# Patient Record
Sex: FEMALE | Race: WHITE
[De-identification: names, ages, dates, MRNs, and addresses within clinical notes are randomized per-mention and may not be internally consistent; named-entity substitution may affect disease eponyms.]

---

## 2018-04-16 ENCOUNTER — HOSPITAL ENCOUNTER (EMERGENCY)
Dept: HOSPITAL 97 - ER | Age: 68
LOS: 1 days | Discharge: HOME | End: 2018-04-17
Payer: COMMERCIAL

## 2018-04-16 DIAGNOSIS — Z88.1: ICD-10-CM

## 2018-04-16 DIAGNOSIS — I10: ICD-10-CM

## 2018-04-16 DIAGNOSIS — R11.0: ICD-10-CM

## 2018-04-16 DIAGNOSIS — N39.0: Primary | ICD-10-CM

## 2018-04-16 DIAGNOSIS — Z86.73: ICD-10-CM

## 2018-04-16 DIAGNOSIS — G43.909: ICD-10-CM

## 2018-04-16 DIAGNOSIS — Z91.048: ICD-10-CM

## 2018-04-16 LAB
ALBUMIN SERPL BCP-MCNC: 3.8 G/DL (ref 3.2–5.5)
ALP SERPL-CCNC: 87 IU/L (ref 42–121)
ALT SERPL W P-5'-P-CCNC: 15 IU/L (ref 10–60)
AST SERPL W P-5'-P-CCNC: 16 IU/L (ref 10–42)
BUN BLD-MCNC: 16 MG/DL (ref 6–20)
CKMB CREATINE KINASE MB: 1.3 NG/ML (ref 0.3–4)
GLUCOSE SERPLBLD-MCNC: 87 MG/DL (ref 65–120)
HCT VFR BLD CALC: 33.4 % (ref 36–45)
INR BLD: 1.11
LIPASE SERPL-CCNC: 14 U/L (ref 22–51)
LYMPHOCYTES # SPEC AUTO: 2 K/UL (ref 0.7–4.9)
MAGNESIUM SERPL-MCNC: 1.9 MG/DL (ref 1.8–2.5)
MCH RBC QN AUTO: 31.4 PG (ref 27–35)
MCV RBC: 92.1 FL (ref 80–100)
PMV BLD: 7.3 FL (ref 7.6–11.3)
POTASSIUM SERPL-SCNC: 3.8 MEQ/L (ref 3.6–5)
RBC # BLD: 3.63 M/UL (ref 3.86–4.86)

## 2018-04-16 PROCEDURE — 80048 BASIC METABOLIC PNL TOTAL CA: CPT

## 2018-04-16 PROCEDURE — 87088 URINE BACTERIA CULTURE: CPT

## 2018-04-16 PROCEDURE — 82550 ASSAY OF CK (CPK): CPT

## 2018-04-16 PROCEDURE — 96361 HYDRATE IV INFUSION ADD-ON: CPT

## 2018-04-16 PROCEDURE — 87086 URINE CULTURE/COLONY COUNT: CPT

## 2018-04-16 PROCEDURE — 83735 ASSAY OF MAGNESIUM: CPT

## 2018-04-16 PROCEDURE — 93005 ELECTROCARDIOGRAM TRACING: CPT

## 2018-04-16 PROCEDURE — 82553 CREATINE MB FRACTION: CPT

## 2018-04-16 PROCEDURE — 96375 TX/PRO/DX INJ NEW DRUG ADDON: CPT

## 2018-04-16 PROCEDURE — 70450 CT HEAD/BRAIN W/O DYE: CPT

## 2018-04-16 PROCEDURE — 87186 SC STD MICRODIL/AGAR DIL: CPT

## 2018-04-16 PROCEDURE — 83690 ASSAY OF LIPASE: CPT

## 2018-04-16 PROCEDURE — 36415 COLL VENOUS BLD VENIPUNCTURE: CPT

## 2018-04-16 PROCEDURE — 85025 COMPLETE CBC W/AUTO DIFF WBC: CPT

## 2018-04-16 PROCEDURE — 87077 CULTURE AEROBIC IDENTIFY: CPT

## 2018-04-16 PROCEDURE — 85730 THROMBOPLASTIN TIME PARTIAL: CPT

## 2018-04-16 PROCEDURE — 83880 ASSAY OF NATRIURETIC PEPTIDE: CPT

## 2018-04-16 PROCEDURE — 85610 PROTHROMBIN TIME: CPT

## 2018-04-16 PROCEDURE — 84484 ASSAY OF TROPONIN QUANT: CPT

## 2018-04-16 PROCEDURE — 80076 HEPATIC FUNCTION PANEL: CPT

## 2018-04-16 PROCEDURE — 81003 URINALYSIS AUTO W/O SCOPE: CPT

## 2018-04-16 PROCEDURE — 99284 EMERGENCY DEPT VISIT MOD MDM: CPT

## 2018-04-16 PROCEDURE — 96374 THER/PROPH/DIAG INJ IV PUSH: CPT

## 2018-04-16 PROCEDURE — 70496 CT ANGIOGRAPHY HEAD: CPT

## 2018-04-16 PROCEDURE — 71045 X-RAY EXAM CHEST 1 VIEW: CPT

## 2018-04-17 VITALS — TEMPERATURE: 98.1 F

## 2018-04-17 VITALS — DIASTOLIC BLOOD PRESSURE: 77 MMHG | OXYGEN SATURATION: 98 % | SYSTOLIC BLOOD PRESSURE: 159 MMHG

## 2018-04-17 NOTE — ER
Nurse's Notes                                                                                     

 [facility]                                                                

Name: Chelsea Alaniz                                                                               

Age: 67 yrs                                                                                       

Sex: Female                                                                                       

: 1950                                                                                   

MRN: [id number]                                                                                   

Arrival Date: 2018                                                                          

Time: 21:43                                                                                       

Account#: [id number]                                                                            

Bed 7                                                                                             

Private MD:                                                                                       

Diagnosis: Headache;Urinary tract infection, site not specified;Migraine;Nausea                   

                                                                                                  

Presentation:                                                                                     

                                                                                             

21:47 Presenting complaint: Patient states: I have had a migraine and been nauseous since     la1 

      1400. I have tried my PRN toradol at home and its not helping. Transition of care:          

      patient was not received from another setting of care. Onset of symptoms was 2018. Care prior to arrival: None.                                                          

21:47 Method Of Arrival: Wheelchair                                                           la1 

21:47 Acuity: ALLYSON 3                                                                           la1 

                                                                                             

01:45 Mechanism of Injury: No Mechanism of Injury.                                            tl1 

                                                                                                  

Triage Assessment:                                                                                

                                                                                             

22:37 Headache History: The patient has had previous headaches. General: Appears in no        ao  

      apparent distress. comfortable. General: Behavior is calm, cooperative, appropriate for     

      age. Pain: Pain currently is 10 out of 10 on a pain scale. Pain began 2-3 days ago.         

      Also complains of no other associated symptoms.                                             

                                                                                                  

Historical:                                                                                       

- Allergies:                                                                                      

21:52 Potassium Chloride; can only take IV form;                                              la1 

21:52 Uknown abx (triliptipine?);                                                             la1 

- PMHx:                                                                                           

21:52 Diabetes - NIDDM; High Cholesterol; Anemia; Anxiety; Migraines; Hypertension; CHF;      la1 

      COPD; back pain; CVA; TIA;                                                                  

- PSHx:                                                                                           

21:52 stent for aneurysm in brain; heart cath; Cholecystectomy; Hysterectomy;                 la1 

                                                                                                  

- Immunization history:: Adult Immunizations up to date.                                          

- Social history:: Smoking status: Patient/guardian denies using tobacco.                         

- Family history:: not pertinent.                                                                 

                                                                                                  

                                                                                                  

Screenin:36 Abuse screen: Denies threats or abuse. Denies injuries from another. Nutritional        ao  

      screening: No deficits noted. Tuberculosis screening: No symptoms or risk factors           

      identified. Fall Risk None identified.                                                      

                                                                                                  

Assessment:                                                                                       

22:32 General: Appears in no apparent distress. comfortable, Behavior is calm, cooperative,   ao  

      appropriate for age. Pain: Complains of pain in Migraine. Neuro: Level of Consciousness     

      is awake, alert, obeys commands, Oriented to person, place, time, situation,                

      Appropriate for age Moves all extremities. Speech is normal, Facial symmetry appears        

      normal, Pupils are PERRLA. Cardiovascular: Capillary refill < 3 seconds Patient's skin      

      is warm and dry. Respiratory: Airway is patent Respiratory effort is even, unlabored,       

      Respiratory pattern is regular, symmetrical. GI: Abdomen is obese. : No signs and/or      

      symptoms were reported regarding the genitourinary system. EENT: No signs and/or            

      symptoms were reported regarding the EENT system. Derm: No signs and/or symptoms            

      reported regarding the dermatologic system. Musculoskeletal: No signs and/or symptoms       

      reported regarding the musculoskeletal system.                                              

23:32 Reassessment: Patient appears in no apparent distress at this time. Patient and/or      ao  

      family updated on plan of care and expected duration. Pain level reassessed. Patient is     

      alert, oriented x 3, equal unlabored respirations, skin warm/dry/pink. Patient still        

      had pain. Dr Hunt notified.                                                             

                                                                                             

00:39 Reassessment: Patient appears in no apparent distress at this time. Patient and/or      ao  

      family updated on plan of care and expected duration. Pain level reassessed. Patient is     

      alert, oriented x 3, equal unlabored respirations, skin warm/dry/pink.                      

01:44 Reassessment: Patient and/or family updated on plan of care and expected duration. Pain tl1 

      level reassessed. Patient is alert, oriented x 3, equal unlabored respirations, skin        

      warm/dry/pink. Patient states feeling better. Patient states symptoms have improved.        

                                                                                                  

Vital Signs:                                                                                      

                                                                                             

21:52  / 80; Pulse 57; Resp 16; Temp 98.1; Pulse Ox 100% on R/A; Weight 124.74 kg;      la1 

      Height 5 ft. 1 in. (154.94 cm);                                                             

23:32  / 66; Pulse 56; Resp 16; Pulse Ox 100% ;                                         ao  

                                                                                             

00:20  / 60; Pulse 55; Resp 16; Pulse Ox 100% ;                                         ao  

01:34  / 77; Pulse 59; Resp 16; Temp 98.1; Pulse Ox 98% ; Pain 5/10;                    tl1 

                                                                                             

21:52 Body Mass Index 51.96 (124.74 kg, 154.94 cm)                                            la1 

                                                                                                  

ED Course:                                                                                        

                                                                                             

21:43 Patient arrived in ED.                                                                  es  

21:48 Triage completed.                                                                       la1 

21:53 Arm band placed on left wrist.                                                          la1 

22:12 Duncan Burgos RN is Primary Nurse.                                                       ao  

22:25 Nish Hunt MD is Attending Physician.                                             alecia 

22:32 Inserted saline lock: 20 gauge in left antecubital area, using aseptic technique.       ao  

      ,using aseptic technique. Using ultrasound guided.                                          

22:36 Patient has correct armband on for positive identification. Pulse ox on. NIBP on.       ao  

22:36 No provider procedures requiring assistance completed.                                  ao  

22:41 Head Brain Wo Cont CT In Process Unspecified.                                           EDMS

23:29 XRAY Chest (1 view) In Process Unspecified.                                             EDMS

04                                                                                             

00:50 Head angio In Process Unspecified.                                                      EDMS

01:09 Phil Marie MD is Referral Physician.                                             alecia 

01:27 Urine Dipstick--Ancillary (enter results) Sent.                                         tl1 

01:45 IV discontinued, intact, bleeding controlled, No redness/swelling at site. Pressure     tl1 

      dressing applied.                                                                           

                                                                                                  

Administered Medications:                                                                         

                                                                                             

22:55 Drug: Zofran 4 mg Route: IVP; Site: left antecubital;                                   ao  

                                                                                             

00:59 Follow up: Response: No adverse reaction                                                ao  

                                                                                             

22:58 Drug: fentaNYL (PF) 50 mcg Route: IVP; Site: left antecubital;                          ao  

                                                                                             

00:58 Follow up: Response: No adverse reaction                                                ao  

00:58 Follow up: Response: Pain is unchanged, physician notified                              ao  

                                                                                             

23:00 Drug: NS 0.9% 1000 ml Route: IV; Rate: 1 bolus; Site: left antecubital;                 ao  

                                                                                             

01:48 Follow up: IV Status: Completed infusion                                                tl1 

00:54 Drug: Zofran 4 mg Route: IVP; Site: left antecubital;                                   ao  

01:26 Follow up: Response: No adverse reaction; Nausea is decreased                           tl1 

00:58 Drug: morphine 2 mg Route: IVP; Site: left antecubital;                                 ao  

01:47 Follow up: Response: No adverse reaction; Marked relief of symptoms; Pain is decreased  tl1 

01:24 Drug: Rocephin - (cefTRIAXone) 1 grams Route: IVPB; Infused Over: 30 mins; Site: left   tl1 

      antecubital;                                                                                

01:38 Follow up: IV Status: Completed infusion                                                tl1 

01:24 Drug: Cipro 500 mg Route: PO;                                                           tl1 

01:46 Follow up: Response: No adverse reaction; No change in condition                        tl1 

01:25 Drug: Benadryl 25 mg Route: IVP; Infused Over: 2 mins; Site: left antecubital;          tl1 

01:46 Follow up: Response: No adverse reaction; Marked relief of symptoms; Pain is decreased  tl1 

01:26 Drug: TORadol 30 mg Route: IVP; Infused Over: 2 mins; Site: left antecubital;           tl1 

01:46 Follow up: Response: No adverse reaction; Marked relief of symptoms; Pain is decreased  tl1 

01:36 Drug: morphine 2 mg Route: IVP; Infused Over: 2 mins; Site: left antecubital;           tl1 

01:47 Follow up: Response: No adverse reaction; Marked relief of symptoms; Pain is decreased  tl1 

                                                                                                  

                                                                                                  

Outcome:                                                                                          

01:09 Discharge ordered by MD. alston 

01:45 Discharged to home via wheelchair, with family.                                         tl1 

01:45 Condition: improved                                                                         

01:45 Discharge instructions given to patient, family, Instructed on discharge instructions,      

      follow up and referral plans. medication usage, Demonstrated understanding of               

      instructions, follow-up care, medications, Prescriptions given X 3.                         

01:48 Patient left the ED.                                                                    tl1 

                                                                                                  

Addendum:                                                                                         

2018                                                                                        

     08:22 Addendum: Culture Results: Positive urine culture. Bacteria is resistant to, has        i
w

           intermediate sensitivity, or is not tested against prescribed antibiotics. Report given

           to ALVA for further evaluation and then to ED Manager for follow up with patient. Phone 

           call Attempt #1 pt phone is disconnected.                                              

                                                                                                  

Signatures:                                                                                       

Dispatcher [employer]                           Nish Fernandez MD MD cha Salyer, Edna es Williams, Irene, RN RN iw Attema, Lee, RN RN la1                                                  

Latia Yan RN RN   tl1                                                  

Duncan Burgos RN                         RN   ao                                                   

                                                                                                  

Corrections: (The following items were deleted from the chart)                                    

                                                                                             

01:36 00:20  / 60; Pulse 55bpm; Resp 16bpm; Pulse Ox 100%; Pain 0/10; ao                ao  

                                                                                                  

**************************************************************************************************

## 2018-04-17 NOTE — RAD REPORT
EXAM DESCRIPTION:  RAD - Chest Single View - 4/16/2018 11:31 pm

 

CLINICAL HISTORY:  Cough

 

COMPARISON:  None.

 

TECHNIQUE:  AP portable chest image was obtained 2320 hours .

 

FINDINGS:  Normal volume. No peripheral consolidation or mass identified. Vague nodular density along
 the left heart shadow border in the lower left lung field is probably a summation artifact. Portable
 imaging and body habitus limit assessment. Mediastinum is prominent due to slight rotation, body hab
itus and portable imaging. Trachea is midline. Heart and vasculature are normal. No measurable pleura
l effusion and no pneumothorax. No gross bony abnormality seen. No acute aortic findings suspected.

 

IMPRESSION:  No acute cardiopulmonary process.

 

Exam is limited due to portable technique and large body habitus. Followup two view imaging could be 
obtained for further assessment.

## 2018-04-17 NOTE — EKG
Test Date:    2018-04-16               Test Time:    22:51:11

Technician:   EPI                                     

                                                     

MEASUREMENT RESULTS:                                       

Intervals:                                           

Rate:         53                                     

WI:           186                                    

QRSD:         88                                     

QT:           492                                    

QTc:          461                                    

Axis:                                                

P:            79                                     

WI:           186                                    

QRS:          -14                                    

T:            50                                     

                                                     

INTERPRETIVE STATEMENTS:                                       

                                                     

Sinus bradycardia

Low voltage QRS

Borderline ECG

No previous ECG available for comparison



Electronically Signed On 04-17-18 08:25:36 CDT by Mahesh Rhodes

## 2018-04-17 NOTE — EDPHYS
Physician Documentation                                                                           

 [facility]                                                                

Name: Chelsea Alaniz                                                                               

Age: 67 yrs                                                                                       

Sex: Female                                                                                       

: 1950                                                                                   

MRN: [id number]                                                                                   

Arrival Date: 2018                                                                          

Time: 21:43                                                                                       

Account#: [id number]                                                                            

Bed 7                                                                                             

Private MD:                                                                                       

ED Physician Nish Hunt                                                                      

HPI:                                                                                              

                                                                                             

22:34 This 67 yrs old  Female presents to ER via Wheelchair with complaints of       alecia 

      Headache.                                                                                   

22:34 The patient complains of pain to the top of head, forehead, left frontal area, left     alecia 

      side of the back of head, left occipital area, right frontal area, right side of the        

      back of head and right temporal area. The patient describes the headache as constant.       

      Onset: The symptoms/episode began/occurred yesterday. Associated signs and symptoms:        

      Pertinent positives: nausea. Severity of symptoms: At its worst the pain was moderate,      

      in the emergency department the pain is unchanged. Headache History: The patient has        

      had previous headaches and this one is similar to previous episodes. The symptoms are       

      alleviated by nothing. the symptoms are aggravated by nothing. The patient has              

      experienced similar episodes in the past, several times.                                    

                                                                                                  

Historical:                                                                                       

- Allergies:                                                                                      

21:52 Potassium Chloride; can only take IV form;                                              la1 

21:52 Uknown abx (triliptipine?);                                                             la1 

- PMHx:                                                                                           

21:52 Diabetes - NIDDM; High Cholesterol; Anemia; Anxiety; Migraines; Hypertension; CHF;      la1 

      COPD; back pain; CVA; TIA;                                                                  

- PSHx:                                                                                           

21:52 stent for aneurysm in brain; heart cath; Cholecystectomy; Hysterectomy;                 la1 

                                                                                                  

- Immunization history:: Adult Immunizations up to date.                                          

- Social history:: Smoking status: Patient/guardian denies using tobacco.                         

- Family history:: not pertinent.                                                                 

                                                                                                  

                                                                                                  

ROS:                                                                                              

22:34 Constitutional: Negative for fever, chills, and weight loss, Eyes: Negative for injury, alecia 

      pain, redness, and discharge, ENT: Negative for injury, pain, and discharge, Neck:          

      Negative for injury, pain, and swelling, Cardiovascular: Negative for chest pain,           

      palpitations, and edema, Respiratory: Negative for shortness of breath, cough,              

      wheezing, and pleuritic chest pain, Abdomen/GI: Negative for abdominal pain, nausea,        

      vomiting, diarrhea, and constipation, Back: Negative for injury and pain, : Negative      

      for injury, bleeding, discharge, and swelling, MS/Extremity: Negative for injury and        

      deformity, Skin: Negative for injury, rash, and discoloration, Psych: Negative for          

      depression, anxiety, suicide ideation, homicidal ideation, and hallucinations,              

      Allergy/Immunology: Negative for hives, rash, and allergies, Endocrine: Negative for        

      neck swelling, polydipsia, polyuria, polyphagia, and marked weight changes,                 

      Hematologic/Lymphatic: Negative for swollen nodes, abnormal bleeding, and unusual           

      bruising.                                                                                   

22:34 Neuro: Positive for headache.                                                               

                                                                                                  

Exam:                                                                                             

22:34 Constitutional:  This is a well developed, well nourished patient who is awake, alert,  alecia 

      and in no acute distress. Head/Face:  Normocephalic, atraumatic. Eyes:  Pupils equal        

      round and reactive to light, extra-ocular motions intact.  Lids and lashes normal.          

      Conjunctiva and sclera are non-icteric and not injected.  Cornea within normal limits.      

      Periorbital areas with no swelling, redness, or edema. ENT:  Nares patent. No nasal         

      discharge, no septal abnormalities noted.  Tympanic membranes are normal and external       

      auditory canals are clear.  Oropharynx with no redness, swelling, or masses, exudates,      

      or evidence of obstruction, uvula midline.  Mucous membranes moist. Neck:  Trachea          

      midline, no thyromegaly or masses palpated, and no cervical lymphadenopathy.  Supple,       

      full range of motion without nuchal rigidity, or vertebral point tenderness.  No            

      Meningismus. Chest/axilla:  Normal chest wall appearance and motion.  Nontender with no     

      deformity.  No lesions are appreciated. Cardiovascular:  Regular rate and rhythm with a     

      normal S1 and S2.  No gallops, murmurs, or rubs.  Normal PMI, no JVD.  No pulse             

      deficits. Respiratory:  Lungs have equal breath sounds bilaterally, clear to                

      auscultation and percussion.  No rales, rhonchi or wheezes noted.  No increased work of     

      breathing, no retractions or nasal flaring. Abdomen/GI:  Soft, non-tender, with normal      

      bowel sounds.  No distension or tympany.  No guarding or rebound.  No evidence of           

      tenderness throughout. Back:  No spinal tenderness.  No costovertebral tenderness.          

      Full range of motion. Skin:  Warm, dry with normal turgor.  Normal color with no            

      rashes, no lesions, and no evidence of cellulitis. MS/ Extremity:  Pulses equal, no         

      cyanosis.  Neurovascular intact.  Full, normal range of motion. Neuro:  Awake and           

      alert, GCS 15, oriented to person, place, time, and situation.  Cranial nerves II-XII       

      grossly intact.  Motor strength 5/5 in all extremities.  Sensory grossly intact.            

      Cerebellar exam normal.  Normal gait. Psych:  Awake, alert, with orientation to person,     

      place and time.  Behavior, mood, and affect are within normal limits.                       

22:34 Neck: ROM/movement: is normal, no acute changes, Meningeal signs: are not present,          

      Kernig's sign is negative, Brudzinski's sign is negative.                                   

                                                                                                  

Vital Signs:                                                                                      

21:52  / 80; Pulse 57; Resp 16; Temp 98.1; Pulse Ox 100% on R/A; Weight 124.74 kg;      la1 

      Height 5 ft. 1 in. (154.94 cm);                                                             

23:32  / 66; Pulse 56; Resp 16; Pulse Ox 100% ;                                         ao  

                                                                                             

00:20  / 60; Pulse 55; Resp 16; Pulse Ox 100% ;                                         ao  

01:34  / 77; Pulse 59; Resp 16; Temp 98.1; Pulse Ox 98% ; Pain 5/10;                    tl1 

                                                                                             

21:52 Body Mass Index 51.96 (124.74 kg, 154.94 cm)                                            la1 

                                                                                                  

MDM:                                                                                              

                                                                                             

22:25 Patient medically screened.                                                             [facility] 

22:37 Data reviewed: vital signs, nurses notes, lab test result(s), EKG, radiologic studies,  [facility] 

      CT scan, plain films.                                                                       

                                                                                                  

                                                                                             

22:33 Order name: Basic Metabolic Panel; Complete Time: 00:09                                 [facility] 

                                                                                             

22:33 Order name: BNP                                                                         [facility] 

                                                                                             

22:33 Order name: CBC with Diff; Complete Time: 00:09                                         [facility] 

                                                                                             

22:33 Order name: Ckmb; Complete Time: 00:10                                                  [facility] 

                                                                                             

22:33 Order name: CPK; Complete Time: 00:10                                                   [facility] 

                                                                                             

22:33 Order name: LFT's; Complete Time: 00:10                                                 [facility] 

                                                                                             

22:33 Order name: Magnesium; Complete Time: 00:10                                             [facility] 

                                                                                             

22:33 Order name: PT-INR; Complete Time: 00:09                                                [facility] 

                                                                                             

22:33 Order name: Ptt, Activated; Complete Time: 00:09                                        [facility] 

                                                                                             

22:33 Order name: Troponin (emerg Dept Use Only); Complete Time: 00:10                        [facility] 

                                                                                             

22:33 Order name: Lipase; Complete Time: 00:09                                                [facility] 

                                                                                             

22:33 Order name: Urine Culture                                                               [facility] 

                                                                                             

00:58 Order name: Urine Dipstick--Ancillary (enter results)                                   em1 

                                                                                             

00:58 Order name: Urine Dipstick-Ancillary                                                    [facility]

                                                                                             

21:58 Order name: Head Brain Wo Cont CT                                                       la1 

                                                                                             

22:33 Order name: XRAY Chest (1 view)                                                         [facility] 

                                                                                             

22:33 Order name: EKG; Complete Time: 22:34                                                   [facility] 

                                                                                             

22:33 Order name: Cardiac monitoring; Complete Time: 23:26                                    [facility] 

                                                                                             

22:33 Order name: EKG - Nurse/Tech; Complete Time: 23:26                                      [facility] 

                                                                                             

23:57 Order name: Head angio                                                                  [facility]

                                                                                             

22:33 Order name: IV Saline Lock; Complete Time: 22:38                                        [facility] 

                                                                                             

22:33 Order name: Labs collected and sent; Complete Time: 22:38                               [facility] 

                                                                                             

22:33 Order name: O2 Per Protocol; Complete Time: 22:38                                       [facility] 

                                                                                             

22:33 Order name: O2 Sat Monitoring; Complete Time: 22:38                                     [facility] 

                                                                                             

22:33 Order name: Urine Dipstick-Ancillary (obtain specimen); Complete Time: 00:48            [facility] 

                                                                                                  

Administered Medications:                                                                         

22:55 Drug: Zofran 4 mg Route: IVP; Site: left antecubital;                                   ao  

                                                                                             

00:59 Follow up: Response: No adverse reaction                                                ao  

                                                                                             

22:58 Drug: fentaNYL (PF) 50 mcg Route: IVP; Site: left antecubital;                          ao  

                                                                                             

00:58 Follow up: Response: No adverse reaction                                                ao  

00:58 Follow up: Response: Pain is unchanged, physician notified                              ao  

                                                                                             

23:00 Drug: NS 0.9% 1000 ml Route: IV; Rate: 1 bolus; Site: left antecubital;                 ao  

                                                                                             

01:48 Follow up: IV Status: Completed infusion                                                tl1 

00:54 Drug: Zofran 4 mg Route: IVP; Site: left antecubital;                                   ao  

01:26 Follow up: Response: No adverse reaction; Nausea is decreased                           tl1 

00:58 Drug: morphine 2 mg Route: IVP; Site: left antecubital;                                 ao  

01:47 Follow up: Response: No adverse reaction; Marked relief of symptoms; Pain is decreased  tl1 

01:24 Drug: Rocephin - (cefTRIAXone) 1 grams Route: IVPB; Infused Over: 30 mins; Site: left   tl1 

      antecubital;                                                                                

01:38 Follow up: IV Status: Completed infusion                                                tl1 

01:24 Drug: Cipro 500 mg Route: PO;                                                           tl1 

01:46 Follow up: Response: No adverse reaction; No change in condition                        tl1 

01:25 Drug: Benadryl 25 mg Route: IVP; Infused Over: 2 mins; Site: left antecubital;          tl1 

01:46 Follow up: Response: No adverse reaction; Marked relief of symptoms; Pain is decreased  tl1 

01:26 Drug: TORadol 30 mg Route: IVP; Infused Over: 2 mins; Site: left antecubital;           tl1 

01:46 Follow up: Response: No adverse reaction; Marked relief of symptoms; Pain is decreased  tl1 

01:36 Drug: morphine 2 mg Route: IVP; Infused Over: 2 mins; Site: left antecubital;           tl1 

01:47 Follow up: Response: No adverse reaction; Marked relief of symptoms; Pain is decreased  tl1 

                                                                                                  

                                                                                                  

Disposition:                                                                                      

18 01:09 Discharged to Home. Impression: Headache, Urinary tract infection, site not        

  specified, Migraine, Nausea.                                                                    

- Condition is Stable.                                                                            

- Discharge Instructions: General Headache Without Cause, Urinary Tract Infection,                

  Urinary Tract Infection, Easy-to-Read, Migraine Headache, Easy-to-Read, General                 

  Headache Without Cause, Easy-to-Read.                                                           

- Prescriptions for Fioricet with Codeine 50- 325-40-30 mg Oral capsule - take 1                  

  capsule by ORAL route every 4 hours as needed not to exceed 6 capsules per 24hrs; 20            

  capsule. Cipro 250 mg Oral Tablet - take 1 tablet by ORAL route every 12 hours; 14              

  tablet. Zofran 4 mg Oral Tablet - take 1 tablet by ORAL route every 12 hours As                 

  needed; 20 tablet.                                                                              

- Medication Reconciliation Form, Thank You Letter, Antibiotic Education, Prescription            

  Opioid Use form.                                                                                

- Follow up: Private Physician; When: 2 - 3 days; Reason: Recheck today's complaints,             

  Continuance of care, Re-evaluation by your physician. Follow up: Phil Marie;               

  When: 2 - 3 days; Reason: Recheck today's complaints, Re-evaluation by your physician.          

- Problem is new.                                                                                 

- Symptoms have improved.                                                                         

                                                                                                  

                                                                                                  

                                                                                                  

Signatures:                                                                                       

Dispatcher [employer]                           Nish Fernandez MD MD cha Attema, Lee RN                         RN   la1                                                  

Latia Yan RN                      RN   tl1                                                  

Duncan Burgos RN                         RN   ao                                                   

                                                                                                  

**************************************************************************************************

## 2018-04-17 NOTE — RAD REPORT
EXAM DESCRIPTION:  CT - Head angio - 4/17/2018 1:34 am

 

CLINICAL HISTORY:  Headache, nausea, prior CVA, prior intravascular treatment of an aneurysm

 

 A preliminary written report was provided at the time of the study, and the report was reviewed prio
r to final dictation.

 

COMPARISON:  CT head same date

 

TECHNIQUE:  During dynamic enhancement using nonionic IV contrast, axial 1 millimeter thick images we
re generated. Coronal and sagittal reformatted images were generated and reviewed.

 

FINDINGS:  Prior intravascular treatment of aneurysm near the supraclinoid terminus of the internal c
arotid artery on the left creates substantial spray artifact over multiple images. There is substanti
al limitation in vascular assessment on these affected images.

 

With these limitations in mind, elsewhere the vasculature shows no aneurysm or malformation. No focal
 luminal narrowing seen. No subarachnoid hemorrhage suspected on this examination or the CT head stud
y.

 

IMPRESSION:  No aneurysm or vascular malformation identified.

 

Patient has had prior intravascular treatment of an aneurysm near the left internal carotid artery te
rminus. This creates a substantial spray artifact affecting the vasculature most commonly associated 
with aneurysms.

 

If there are ongoing concerns for aneurysm, traditional digital subtraction angiography would be need
ed. MR imaging would not be helpful in this setting.

## 2018-04-17 NOTE — RAD REPORT
EXAM DESCRIPTION:  CT - Head Brain Wo Cont - 4/17/2018 1:35 am

 

CLINICAL HISTORY:  Headache

 

 A preliminary written report was provided at the time of the study, and the report was reviewed prio
r to final dictation.

 

COMPARISON:  None.

 

TECHNIQUE:  Axial 5 mm thick images of the head were obtained without IV contrast.

 

All CT scans are performed using dose optimization technique as appropriate and may include automated
 exposure control or mA/KV adjustment according to patient size.

 

FINDINGS:  No intracranial hemorrhage, mass, edema or shift of mid-line structures. No acute cortical
 based infarction is identifiable. Approximately 3 centimeter area of encephalomalacia is present in 
the posterior left parietal occipital junction. This has the appearance of old CVA. There is slight e
nlargement of the atrium of the left lateral ventricle in response to the volume loss. No abnormal ex
tra-axial fluid collections. Overall ventricle size is normal. No significant atrophy or chronic isch
emic change present.

 

Patient has previously undergone coiling of the left-side [place] of [place] aneurysm. This creates lesa
y substantial spray artifact limiting detail on several slices of this examination.

 

Mastoid air cells and visualized portions of the paranasal sinuses are clear.

 

No acute bony findings.

 

 

IMPRESSION:  No hemorrhage or acute intracranial finding.

 

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

 

Substantial spray artifact from [place] of [place] aneurysm treatment limits brain parenchymal assessme
nt on several images.

## 2018-04-23 ENCOUNTER — HOSPITAL ENCOUNTER (EMERGENCY)
Dept: HOSPITAL 97 - ER | Age: 68
LOS: 1 days | Discharge: HOME | End: 2018-04-24
Payer: COMMERCIAL

## 2018-04-23 DIAGNOSIS — Z88.8: ICD-10-CM

## 2018-04-23 DIAGNOSIS — G43.909: Primary | ICD-10-CM

## 2018-04-23 DIAGNOSIS — F41.9: ICD-10-CM

## 2018-04-23 DIAGNOSIS — E78.00: ICD-10-CM

## 2018-04-23 DIAGNOSIS — I50.9: ICD-10-CM

## 2018-04-23 DIAGNOSIS — I10: ICD-10-CM

## 2018-04-23 DIAGNOSIS — E11.9: ICD-10-CM

## 2018-04-23 PROCEDURE — 99283 EMERGENCY DEPT VISIT LOW MDM: CPT

## 2018-04-23 PROCEDURE — 96374 THER/PROPH/DIAG INJ IV PUSH: CPT

## 2018-04-23 PROCEDURE — 96372 THER/PROPH/DIAG INJ SC/IM: CPT

## 2018-04-23 PROCEDURE — 96361 HYDRATE IV INFUSION ADD-ON: CPT

## 2018-04-23 NOTE — EDPHYS
Physician Documentation                                                                           

 [facility]                                                                

Name: Chelsea Alaniz                                                                               

Age: 67 yrs                                                                                       

Sex: Female                                                                                       

: 1950                                                                                   

MRN: [id number]                                                                                   

Arrival Date: 2018                                                                          

Time: 19:40                                                                                       

Account#: [id number]                                                                            

Bed 18                                                                                            

Private MD: out of town, doctor                                                                   

ED Physician Loco Lowe                                                                       

HPI:                                                                                              

                                                                                             

22:28 This 67 yrs old  Female presents to ER via Wheelchair with complaints of       kb  

      Headache > 24hrs Old, Nausea.                                                               

22:28 The patient complains of pain to the top of head. The patient describes the headache as kb  

      constant. Onset: The symptoms/episode began/occurred 3 day(s) ago. Associated signs and     

      symptoms: Pertinent positives: nausea, Photophobia. Severity of symptoms: At its worst      

      the pain was moderate, in the emergency department the pain is unchanged. Headache          

      History: The patient has had previous headaches and this one is similar to previous         

      episodes. The symptoms are alleviated by nothing. the symptoms are aggravated by            

      lights, noise. The patient has experienced similar episodes in the past, chronically.       

      The patient has been recently seen by a physician:. Pt reports migraine since Friday.       

      Received botox on Friday with no relief. .                                                  

                                                                                                  

Historical:                                                                                       

- Allergies:                                                                                      

20:01 Triamterene;                                                                            aa1 

20:01 POTASSIUM CHLORIDE; can only take IV form;                                              aa1 

- Home Meds:                                                                                      

20:01 Fioricet with Codeine -19-30 mg oral cap 1 cap every 4 hours [Active]; Zofran (as aa1 

      hydrochloride) 4 mg Oral tab [Active]; Cipro 250 mg Oral tab 1 tab every 12 hours           

      [Active];                                                                                   

20:12 Xanax 0.5 mg Oral tab [Active]; amitriptyline 50 mg Oral tab 1 tab once daily [Active]; aa1 

      Klonopin 2 mg Oral tab [Active]; dicyclomine 20 mg Oral tab [Active]; Lasix 20 mg Oral      

      tab [Active]; duloxetine 60 mg oral cpDR [Active]; Generlac 10 gram/15 mL oral soln         

      [Active]; levocetirizine 5 mg oral tab 1 tab once daily [Active]; lovastatin 40 mg Oral     

      tab [Active]; montelukast 10 mg oral tab [Active]; mupirocin calcium 2 % topical crea       

      [Active]; olmesartan-hydrochlorothiazide oral oral [Active]; spironolactone 25 mg Oral      

      tab [Active]; topiramate 25 mg oral tab [Active]; Zenpep 5,000-17,000 -27,000 unit oral     

      cpDR [Active]; zolpidem 10 mg Oral tab [Active];                                            

- PMHx:                                                                                           

20:01 Anemia; Anxiety; Back pain; CHF; COPD; CVA; Diabetes - NIDDM; High Cholesterol;         aa1 

      Hypertension; Migraines; TIA;                                                               

- PSHx:                                                                                           

20:01 stent for aneurysm in brain; heart cath; Cholecystectomy; Hysterectomy;                 aa1 

                                                                                                  

- Immunization history:: Adult Immunizations up to date.                                          

- Social history:: Smoking status: Patient/guardian denies using tobacco.                         

                                                                                                  

                                                                                                  

ROS:                                                                                              

22:28 Constitutional: Negative for fever, chills, and weight loss, Cardiovascular: Negative   kb  

      for chest pain, palpitations, and edema, Respiratory: Negative for shortness of breath,     

      cough, wheezing, and pleuritic chest pain, Abdomen/GI: Negative for abdominal pain,         

      nausea, vomiting, diarrhea, and constipation, Back: Negative for injury and pain, :       

      Negative for injury, bleeding, discharge, and swelling, MS/Extremity: Negative for          

      injury and deformity, Skin: Negative for injury, rash, and discoloration.                   

22:28 Neuro: Positive for headache.                                                               

                                                                                                  

Exam:                                                                                             

22:28 Constitutional:  This is a well developed, well nourished patient who is awake, alert,  kb  

      and in no acute distress. Head/Face:  Normocephalic, atraumatic. Chest/axilla:  Normal      

      chest wall appearance and motion.  Nontender with no deformity.  No lesions are             

      appreciated. Cardiovascular:  Regular rate and rhythm with a normal S1 and S2.  No          

      gallops, murmurs, or rubs.  Normal PMI, no JVD.  No pulse deficits. Respiratory:  Lungs     

      have equal breath sounds bilaterally, clear to auscultation and percussion.  No rales,      

      rhonchi or wheezes noted.  No increased work of breathing, no retractions or nasal          

      flaring. Abdomen/GI:  Soft, non-tender, with normal bowel sounds.  No distension or         

      tympany.  No guarding or rebound.  No evidence of tenderness throughout. Skin:  Warm,       

      dry with normal turgor.  Normal color with no rashes, no lesions, and no evidence of        

      cellulitis. MS/ Extremity:  Pulses equal, no cyanosis.  Neurovascular intact.  Full,        

      normal range of motion. Neuro:  Awake and alert, GCS 15, oriented to person, place,         

      time, and situation.  Cranial nerves II-XII grossly intact.  Motor strength 5/5 in all      

      extremities.  Sensory grossly intact.  Cerebellar exam normal.  Normal gait.                

                                                                                                  

Vital Signs:                                                                                      

20:01  / 78; Pulse 57; Resp 18; Temp 98.1; Pulse Ox 97% on R/A; Weight 124.74 kg;       aa1 

      Height 5 ft. 1 in. (154.94 cm); Pain 10/10;                                                 

22:09  / 65; Pulse 54; Resp 18; Pulse Ox 98% on R/A;                                    jd3 

22:55  / 87; Pulse 54; Resp 18 S; Pulse Ox 97% on R/A;                                  jd3 

                                                                                             

00:23  / 74; Pulse 55; Resp 18 S; Pulse Ox 100% on R/A; Pain 3/10;                      jd3 

                                                                                             

20:01 Body Mass Index 51.96 (124.74 kg, 154.94 cm)                                            aa1 

                                                                                                  

[place] Coma Score:                                                                               

                                                                                             

22:28 Eye Response: spontaneous(4). Verbal Response: oriented(5). Motor Response: obeys       kb  

      commands(6). Total: 15.                                                                     

                                                                                                  

MDM:                                                                                              

20:14 Patient medically screened.                                                             kb  

22:28 Data reviewed: vital signs, nurses notes. Data interpreted: Pulse oximetry: on room air kb  

      is 98 %. Interpretation: normal. Counseling: I had a detailed discussion with the           

      patient and/or guardian regarding: the historical points, exam findings, and any            

      diagnostic results supporting the discharge/admit diagnosis, the need for outpatient        

      follow up, a neurologist, to return to the emergency department if symptoms worsen or       

      persist or if there are any questions or concerns that arise at home.                       

                                                                                                  

                                                                                             

20:38 Order name: IV Start; Complete Time: 22:59                                              jd3 

                                                                                                  

Administered Medications:                                                                         

21:13 Not Given (Other Intervention Used): NS 0.9% 500 ml IV at bolus once                    kb  

21:13 Not Given (Other Intervention Used): TORadol 30 mg IVP once                             kb  

21:13 Not Given (Other Intervention Used): Reglan 10 mg IVP once; over 1 to 2 minutes         kb  

21:13 Not Given (Duplicate Order): Benadryl 12.5 mg IVP once                                  kb  

21:34 Drug: Benadryl 25 mg Route: IM; Site: right deltoid;                                    jd3 

22:58 Follow up: Response: No adverse reaction                                                jd3 

:34 Drug: TORadol 60 mg Route: IM; Site: right deltoid;                                     jd3 

:59 Follow up: Response: Pain is unchanged, physician notified                              jd3 

:34 Drug: Reglan 10 mg Route: PO;                                                           jd3 

:59 Follow up: Response: No adverse reaction                                                jd3 

22:21 Drug: Norco 10 mg-325 mg 1 tabs Route: PO;                                              jd3 

:59 Follow up: Response: Pain is unchanged, physician notified                              jd3 

23:16 Drug: Demerol 25 mg Route: IVP; Site: left hand;                                        jd3 

                                                                                             

00:24 Follow up: Response: No adverse reaction; Pain is decreased                             jd3 

                                                                                             

23:17 Drug: NS 0.9% 500 ml Route: IV; Rate: bolus; Site: left hand;                           jd3 

                                                                                             

00:21 Follow up: Response: Pain is decreased; IV Status: Completed infusion; IV Intake: 500ml jd3 

                                                                                                  

                                                                                                  

Disposition:                                                                                      

00:55 Co-signature as Attending Physician, Loco Lowe MD I agree with the assessment and   kdr 

      plan of care.                                                                               

                                                                                                  

Disposition:                                                                                      

18 22:30 Discharged to Home. Impression: Migraine.                                          

- Condition is Stable.                                                                            

- Discharge Instructions: Migraine Headache, Easy-to-Read.                                        

                                                                                                  

- Medication Reconciliation Form, Thank You Letter, Antibiotic Education, Prescription            

  Opioid Use form.                                                                                

- Follow up: Emergency Department; When: As needed; Reason: Worsening of condition.               

  Follow up: Private Physician; When: 2 - 3 days; Reason: Recheck today's complaints,             

  Continuance of care, Re-evaluation by your physician.                                           

                                                                                                  

                                                                                                  

                                                                                                  

Signatures:                                                                                       

Pamela Hamilton, ELISE-C                 ELISE-Tara Vieyra, RN                        RN   aa1                                                  

Loco Lowe MD MD   kdr                                                  

Gabriel Hernandez RN                    RN   jd3                                                  

                                                                                                  

**************************************************************************************************

## 2018-04-23 NOTE — ER
Nurse's Notes                                                                                     

 [facility]                                                                

Name: Chelsea Alaniz                                                                               

Age: 67 yrs                                                                                       

Sex: Female                                                                                       

: 1950                                                                                   

MRN: [id number]                                                                                   

Arrival Date: 2018                                                                          

Time: 19:40                                                                                       

Account#: [id number]                                                                            

Bed 18                                                                                            

Private MD: out of town, doctor                                                                   

Diagnosis: Migraine                                                                               

                                                                                                  

Presentation:                                                                                     

                                                                                             

19:54 Presenting complaint: Patient states: migraine x 4 days. Reports hx of chronic          aa1 

      migraines and has been doing Botox injections for her symptoms but states this one          

      won't go away. Transition of care: patient was not received from another setting of         

      care. Onset of symptoms was 2018. Initial Sepsis Screen: Does the patient         

      meet any 2 criteria? No. Patient's initial sepsis screen is negative. Does the patient      

      have a suspected source of infection? No. Patient's initial sepsis screen is negative.      

      Care prior to arrival: None.                                                                

19:54 Method Of Arrival: Wheelchair                                                           aa1 

19:54 Acuity: ALLYSON 3                                                                           aa1 

                                                                                                  

Triage Assessment:                                                                                

20:01 Headache History: The patient has had previous headaches and this one is similar to     aa1 

      previous episodes. General: Appears in no apparent distress. uncomfortable, Behavior is     

      calm, cooperative, appropriate for age.                                                     

20:16 Pain: Pain at worst was 10 out of 10 on a pain scale. Pain began 2 hours ago. Also      jd3 

      complains of nausea.                                                                        

                                                                                                  

Historical:                                                                                       

- Allergies:                                                                                      

20:01 Triamterene;                                                                            aa1 

20:01 POTASSIUM CHLORIDE; can only take IV form;                                              aa1 

- Home Meds:                                                                                      

20:01 Fioricet with Codeine -39-30 mg oral cap 1 cap every 4 hours [Active]; Zofran (as aa1 

      hydrochloride) 4 mg Oral tab [Active]; Cipro 250 mg Oral tab 1 tab every 12 hours           

      [Active];                                                                                   

20:12 Xanax 0.5 mg Oral tab [Active]; amitriptyline 50 mg Oral tab 1 tab once daily [Active]; aa1 

      Klonopin 2 mg Oral tab [Active]; dicyclomine 20 mg Oral tab [Active]; Lasix 20 mg Oral      

      tab [Active]; duloxetine 60 mg oral cpDR [Active]; Generlac 10 gram/15 mL oral soln         

      [Active]; levocetirizine 5 mg oral tab 1 tab once daily [Active]; lovastatin 40 mg Oral     

      tab [Active]; montelukast 10 mg oral tab [Active]; mupirocin calcium 2 % topical crea       

      [Active]; olmesartan-hydrochlorothiazide oral oral [Active]; spironolactone 25 mg Oral      

      tab [Active]; topiramate 25 mg oral tab [Active]; Zenpep 5,000-17,000 -27,000 unit oral     

      cpDR [Active]; zolpidem 10 mg Oral tab [Active];                                            

- PMHx:                                                                                           

20:01 Anemia; Anxiety; Back pain; CHF; COPD; CVA; Diabetes - NIDDM; High Cholesterol;         aa1 

      Hypertension; Migraines; TIA;                                                               

- PSHx:                                                                                           

20:01 stent for aneurysm in brain; heart cath; Cholecystectomy; Hysterectomy;                 aa1 

                                                                                                  

- Immunization history:: Adult Immunizations up to date.                                          

- Social history:: Smoking status: Patient/guardian denies using tobacco.                         

                                                                                                  

                                                                                                  

Screenin:15 Abuse screen: Denies threats or abuse. Nutritional screening: No deficits noted.        jd3 

      Tuberculosis screening: No symptoms or risk factors identified. Fall Risk Gait- Weak        

      (10 pts.). Total Santos Fall Scale indicates No Risk (0-24 pts).                             

                                                                                                  

Assessment:                                                                                       

20:13 General: Appears uncomfortable, Behavior is calm, cooperative, appropriate for age.     jd3 

      Pain: Complains of pain in head Quality of pain is described as aching, pressure,           

      throbbing. Neuro: Level of Consciousness is awake, alert, obeys commands, Oriented to       

      person, place, time, situation, Pupils are PERRLA. Cardiovascular: Heart tones S1 S2        

      present Capillary refill < 3 seconds Patient's skin is warm and dry. Respiratory:           

      Airway is patent Respiratory effort is even, unlabored, Respiratory pattern is regular,     

      symmetrical, Breath sounds are clear bilaterally. GI: Abdomen is round Bowel sounds         

      present X 4 quads. Abd is soft Abdomen is tender to palpation in right upper quadrant       

      and right lower quadrant Reports nausea. : No signs and/or symptoms were reported         

      regarding the genitourinary system. EENT: No signs and/or symptoms were reported            

      regarding the EENT system. Derm: Skin is intact, Skin is dry, Skin is normal, Skin          

      temperature is warm. Musculoskeletal: Circulation, motion, and sensation intact. Range      

      of motion: intact in all extremities.                                                       

21:30 Reassessment: Patient appears in no apparent distress at this time. No changes from     jd3 

      previously documented assessment. Patient and/or family updated on plan of care and         

      expected duration. Pain level reassessed. Patient is alert, oriented x 3, equal             

      unlabored respirations, skin warm/dry/pink.                                                 

22:56 Reassessment: Patient appears in no apparent distress at this time. No changes from     jd3 

      previously documented assessment. Patient and/or family updated on plan of care and         

      expected duration. Pain level reassessed. Patient is alert, oriented x 3, equal             

      unlabored respirations, skin warm/dry/pink. pt reporting not wanting to leave because       

      headache is still present, provider notified, new orders received, see MAR.                 

                                                                                             

00:22 Reassessment: Patient appears in no apparent distress at this time. Patient and/or      jd3 

      family updated on plan of care and expected duration. Pain level reassessed. Patient is     

      alert, oriented x 3, equal unlabored respirations, skin warm/dry/pink. pt reported          

      understanding of discharge instructions, assisted pt to front of ER with wheelchair         

      Patient states feeling better.                                                              

                                                                                                  

Vital Signs:                                                                                      

                                                                                             

20:01  / 78; Pulse 57; Resp 18; Temp 98.1; Pulse Ox 97% on R/A; Weight 124.74 kg;       aa1 

      Height 5 ft. 1 in. (154.94 cm); Pain 10/10;                                                 

22:09  / 65; Pulse 54; Resp 18; Pulse Ox 98% on R/A;                                    jd3 

22:55  / 87; Pulse 54; Resp 18 S; Pulse Ox 97% on R/A;                                  jd3 

                                                                                             

00:23  / 74; Pulse 55; Resp 18 S; Pulse Ox 100% on R/A; Pain 3/10;                      jd3 

                                                                                             

20:01 Body Mass Index 51.96 (124.74 kg, 154.94 cm)                                            aa1 

                                                                                                  

Tim Coma Score:                                                                               

                                                                                             

22:28 Eye Response: spontaneous(4). Verbal Response: oriented(5). Motor Response: obeys       kb  

      commands(6). Total: 15.                                                                     

                                                                                                  

ED Course:                                                                                        

19:40 Patient arrived in ED.                                                                  am2 

19:41 out of town, doctor is Private Physician.                                               am2 

19:57 Triage completed.                                                                       aa1 

20:01 Arm band placed on right wrist. Patient placed in an exam room, Patient notified of     aa1 

      wait time.                                                                                  

20:13 Gabriel Hernandez RN is Primary Nurse.                                                  jd3 

20:14 [facility]                                                        kb  

20:14 oLco Lowe MD is Attending Physician.                                              kb  

20:16 Patient has correct armband on for positive identification. Bed in low position. Call   j 

      light in reach. Side rails up X2. Adult w/ patient.                                         

21:12 Missed attempt(s): 22 gauge in right antecubital area. Bleeding controlled, band aid    fc  

      applied, catheter tip intact.                                                               

21:14 Missed attempt(s): 24 gauge in right hand. Bleeding controlled, band aid applied,       fc  

      catheter tip intact.                                                                        

22:59 Inserted saline lock: 24 gauge in left hand, using aseptic technique. placed by Pee JEFFERSON  [facility] 

      Tech.                                                                                       

                                                                                             

00:22 No provider procedures requiring assistance completed. IV discontinued, intact,         jd3 

      bleeding controlled, No redness/swelling at site. Pressure dressing applied.                

                                                                                                  

Administered Medications:                                                                         

                                                                                             

21:13 Not Given (Other Intervention Used): NS 0.9% 500 ml IV at bolus once                    kb  

21:13 Not Given (Other Intervention Used): TORadol 30 mg IVP once                             kb  

21:13 Not Given (Other Intervention Used): Reglan 10 mg IVP once; over 1 to 2 minutes         kb  

21:13 Not Given (Duplicate Order): Benadryl 12.5 mg IVP once                                  kb  

21:34 Drug: Benadryl 25 mg Route: IM; Site: right deltoid;                                    jd3 

22:58 Follow up: Response: No adverse reaction                                                jd3 

21:34 Drug: TORadol 60 mg Route: IM; Site: right deltoid;                                     jd3 

22:59 Follow up: Response: Pain is unchanged, physician notified                              jd3 

21:34 Drug: Reglan 10 mg Route: PO;                                                           jd3 

22:59 Follow up: Response: No adverse reaction                                                jd3 

22:21 Drug: Norco 10 mg-325 mg 1 tabs Route: PO;                                              jd3 

22:59 Follow up: Response: Pain is unchanged, physician notified                              jd3 

23:16 Drug: Demerol 25 mg Route: IVP; Site: left hand;                                        jd3 

                                                                                             

00:24 Follow up: Response: No adverse reaction; Pain is decreased                             jd3 

                                                                                             

23:17 Drug: NS 0.9% 500 ml Route: IV; Rate: bolus; Site: left hand;                           jd3 

                                                                                             

00:21 Follow up: Response: Pain is decreased; IV Status: Completed infusion; IV Intake: 500ml jd3 

                                                                                                  

                                                                                                  

Intake:                                                                                           

00:21 IV: 500ml; Total: 500ml.                                                                jd3 

                                                                                                  

Outcome:                                                                                          

                                                                                             

22:30 Discharge ordered by MD. gardner  

                                                                                             

00:22 Discharged to home via wheelchair, with family.                                         jd3 

      Condition: stable                                                                           

      Discharge instructions given to patient, family, Instructed on discharge instructions,      

      follow up and referral plans. Demonstrated understanding of instructions, follow-up         

      care.                                                                                       

00:24 Patient left the ED.                                                                    jd3 

                                                                                                  

Signatures:                                                                                       

Pamela Hamilton, GENP-C                 ELISE-Tara Vieyra RN                        RN   aa1                                                  

Katelyn Beyer RN                   RN   Radha Perez Jonathon, RN                    RN   jd3                                                  

                                                                                                  

Corrections: (The following items were deleted from the chart)                                    

                                                                                             

23:48 22:56 Reassessment: Patient appears in no apparent distress at this time. No changes    jd3 

      from previously documented assessment. Patient and/or family updated on plan of care        

      and expected duration. Pain level reassessed. Patient is alert, oriented x 3, equal         

      unlabored respirations, skin warm/dry/pink. jd3                                             

                                                                                                  

**************************************************************************************************

## 2018-04-24 VITALS — TEMPERATURE: 98.1 F

## 2018-04-24 VITALS — SYSTOLIC BLOOD PRESSURE: 139 MMHG | OXYGEN SATURATION: 100 % | DIASTOLIC BLOOD PRESSURE: 74 MMHG

## 2018-05-16 ENCOUNTER — HOSPITAL ENCOUNTER (EMERGENCY)
Dept: HOSPITAL 97 - ER | Age: 68
Discharge: HOME | End: 2018-05-16
Payer: COMMERCIAL

## 2018-05-16 VITALS — TEMPERATURE: 98.2 F

## 2018-05-16 VITALS — DIASTOLIC BLOOD PRESSURE: 86 MMHG | OXYGEN SATURATION: 98 % | SYSTOLIC BLOOD PRESSURE: 168 MMHG

## 2018-05-16 DIAGNOSIS — I10: ICD-10-CM

## 2018-05-16 DIAGNOSIS — G43.909: Primary | ICD-10-CM

## 2018-05-16 DIAGNOSIS — Z88.8: ICD-10-CM

## 2018-05-16 PROCEDURE — 99284 EMERGENCY DEPT VISIT MOD MDM: CPT

## 2018-05-16 PROCEDURE — 96375 TX/PRO/DX INJ NEW DRUG ADDON: CPT

## 2018-05-16 PROCEDURE — 70450 CT HEAD/BRAIN W/O DYE: CPT

## 2018-05-16 PROCEDURE — 96374 THER/PROPH/DIAG INJ IV PUSH: CPT

## 2018-05-16 PROCEDURE — 96361 HYDRATE IV INFUSION ADD-ON: CPT

## 2018-05-16 NOTE — ER
Nurse's Notes                                                                                     

 [facility]                                                                

Name: Chelsea Alaniz                                                                               

Age: 67 yrs                                                                                       

Sex: Female                                                                                       

: 1950                                                                                   

MRN: [id number]                                                                                   

Arrival Date: 2018                                                                          

Time: 18:47                                                                                       

Account#: [id number]                                                                            

Bed 18                                                                                            

Private MD: out of town, doctor                                                                   

Diagnosis: Migraine                                                                               

                                                                                                  

Presentation:                                                                                     

                                                                                             

19:08 Presenting complaint: Patient states: migraine headache since this morning, pt states   la1 

      she woke up with it. reports vomiting x1 today. Transition of care: patient was not         

      received from another setting of care. Onset of symptoms was May 16, 2018. Initial          

      Sepsis Screen: Does the patient meet any 2 criteria? No. Patient's initial sepsis           

      screen is negative. Does the patient have a suspected source of infection? No.              

      Patient's initial sepsis screen is negative. Care prior to arrival: None.                   

19:08 Method Of Arrival: Wheelchair                                                           la1 

19:08 Acuity: ALLYSON 3                                                                           la1 

                                                                                                  

Triage Assessment:                                                                                

20:17 Headache History: The patient has had previous headaches and this one is similar to     jd3 

      previous episodes. General: Appears uncomfortable, Behavior is calm, cooperative,           

      appropriate for age. Pain: Complains of pain in head.                                       

                                                                                                  

Historical:                                                                                       

- Allergies:                                                                                      

19:09 POTASSIUM CHLORIDE; can only take IV form;                                              la1 

19:09 Triamterene;                                                                            la1 

- PMHx:                                                                                           

19:09 Anemia; Anxiety; Back pain; CHF; COPD; CVA; Diabetes - NIDDM; High Cholesterol;         la1 

      Hypertension; Migraines; TIA;                                                               

                                                                                                  

- Immunization history:: Adult Immunizations up to date.                                          

- Social history:: Smoking status: Patient/guardian denies using tobacco.                         

                                                                                                  

                                                                                                  

Screenin:13 Abuse screen: Denies threats or abuse. Nutritional screening: No deficits noted.        jd3 

      Tuberculosis screening: No symptoms or risk factors identified. Fall Risk Ambulatory        

      Aid- Crutches/Cane/Walker (15 pts). Gait- Weak (10 pts.). Mental Status- Oriented to        

      own ability (0 pts). Total Santos Fall Scale indicates Low Risk Score (25-44 pts). Fall      

      prevention measures have been instituted. Side Rails Up X 2 Placed close to Nursing         

      Station Frequent Obs/Assesments occuring Family Present and informed to notify staff if     

      they need to leave bedside.                                                                 

                                                                                                  

Assessment:                                                                                       

20:14 General: Appears uncomfortable, Behavior is calm, cooperative, appropriate for age.     jd3 

      Pain: Complains of pain in head Pain currently is 10 out of 10 on a pain scale. Quality     

      of pain is described as aching, sharp, Pain began pt states "since this morning." Is        

      continuous, Also complains of nausea. Neuro: Level of Consciousness is awake, alert,        

      obeys commands, Oriented to person, place, time, situation. Cardiovascular: Heart tones     

      S1 S2 present Capillary refill < 3 seconds Patient's skin is warm and dry. Respiratory:     

      Airway is patent Respiratory effort is even, unlabored, Respiratory pattern is regular,     

      symmetrical, Breath sounds are clear bilaterally. GI: Abdomen is round obese, Bowel         

      sounds present X 4 quads. Abd is soft and non tender X 4 quads. Reports nausea,             

      vomiting. : No signs and/or symptoms were reported regarding the genitourinary            

      system. EENT: No signs and/or symptoms were reported regarding the EENT system. Derm:       

      Skin is intact, Skin is dry, Skin is normal, Skin temperature is warm. Musculoskeletal:     

      Circulation, motion, and sensation intact. Range of motion: intact in all extremities.      

21:00 Reassessment: Patient appears in no apparent distress at this time. Patient and/or      jd3 

      family updated on plan of care and expected duration. Pain level reassessed. Patient is     

      alert, oriented x 3, equal unlabored respirations, skin warm/dry/pink.                      

22:02 Reassessment: Patient appears in no apparent distress at this time. Patient and/or      jd3 

      family updated on plan of care and expected duration. Pain level reassessed. Patient is     

      alert, oriented x 3, equal unlabored respirations, skin warm/dry/pink.                      

23:00 Reassessment: Patient appears in no apparent distress at this time. Patient and/or      jd3 

      family updated on plan of care and expected duration. Pain level reassessed. Patient is     

      alert, oriented x 3, equal unlabored respirations, skin warm/dry/pink. pt reported          

      understanding of discharge instructions.                                                    

                                                                                                  

Vital Signs:                                                                                      

19:09  / 76; Pulse 62; Resp 19; Temp 98.2; Pulse Ox 100% on R/A; Weight 124.74 kg;      la1 

      Height 5 ft. 1 in. (154.94 cm);                                                             

20:18  / 77; Pulse 60; Resp 19 S; Pulse Ox 98% on R/A; Pain 10/10;                      jd3 

22:01  / 92; Pulse 64; Resp 18 S; Pulse Ox 97% on R/A;                                  jd3 

23:03  / 86; Pulse 68; Resp 18 S; Pulse Ox 98% on R/A; Pain 7/10;                       jd3 

19:09 Body Mass Index 51.96 (124.74 kg, 154.94 cm)                                            la1 

                                                                                                  

ED Course:                                                                                        

18:47 Patient arrived in ED.                                                                  mr  

18:47 out of town, doctor is Private Physician.                                               mr  

19:08 Triage completed.                                                                       la1 

19:09 Arm band placed on right wrist.                                                         la1 

20:13 Gabriel Hernandez, RN is Primary Nurse.                                                  jd3 

20:18 Patient has correct armband on for positive identification. Bed in low position. Call   jd3 

      light in reach. Side rails up X2. Adult w/ patient.                                         

20:20 Inserted saline lock: 20 gauge in right antecubital area, using aseptic technique.      jd3 

      placed by [facility] tech.                                                                       

20:34 Pepito Pope PA is PHCP.                                                               jr8 

20:34 Nish Hunt MD is Attending Physician.                                             jr8 

22:19 Patient moved to CT via stretcher.                                                      nj  

22:20 CT Head Brain wo Cont In Process Unspecified.                                           EDMS

22:20 CT completed. Patient tolerated procedure well. Patient moved back from CT.             nj  

23:00 No provider procedures requiring assistance completed. IV discontinued, intact,         jd3 

      bleeding controlled, No redness/swelling at site. Pressure dressing applied.                

                                                                                                  

Administered Medications:                                                                         

21:14 Drug: TORadol 30 mg Route: IVP; Site: right antecubital;                                jd3 

22:09 Follow up: Response: No adverse reaction                                                jd3 

21:15 Drug: NS 0.9% 1000 ml Route: IV; Rate: 1000 ml; Site: right antecubital;                jd3 

22:08 Follow up: Response: No adverse reaction; IV Status: Completed infusion; IV Intake:     jd3 

      1000ml                                                                                      

21:15 Drug: Reglan 10 mg Route: IVP; Site: right antecubital;                                 jd3 

22:09 Follow up: Response: No adverse reaction                                                jd3 

21:15 Drug: Benadryl 25 mg Route: IVP; Site: right antecubital;                               jd3 

22:09 Follow up: Response: No adverse reaction                                                jd3 

21:15 Drug: Decadron - Dexamethasone 10 mg Route: IVP; Site: right antecubital;               jd3 

22:09 Follow up: Response: No adverse reaction                                                jd3 

22:08 Drug: fentaNYL (PF) 50 mcg Route: IVP; Site: right antecubital;                         jd3 

23:03 Follow up: Response: No adverse reaction                                                jd3 

                                                                                                  

                                                                                                  

Intake:                                                                                           

22:08 IV: 1000ml; Total: 1000ml.                                                              jd3 

                                                                                                  

Outcome:                                                                                          

22:31 Discharge ordered by MD. dubose 

23:02 Discharged to home via wheelchair, with family.                                         jd3 

23:02 Condition: stable                                                                           

23:02 Discharge instructions given to patient, family, Instructed on discharge instructions,      

      follow up and referral plans. Demonstrated understanding of instructions, follow-up         

      care.                                                                                       

23:03 Patient left the ED.                                                                    jd3 

                                                                                                  

Signatures:                                                                                       

Dispatcher [employer]                           Lety Gonzalez Josh, PA PA jr8 Attema, Lee RN                         RN   Tigre oLu Jonathon, RN                    RN   jd3                                                  

                                                                                                  

Corrections: (The following items were deleted from the chart)                                    

23:02 23:00 Inserted saline lock: 20 gauge in right antecubital area, using aseptic           jd3 

      technique. placed by [facility] tech jd3                                                         

                                                                                                  

**************************************************************************************************

## 2018-05-16 NOTE — EDPHYS
Physician Documentation                                                                           

 [facility]                                                                

Name: Chelsea Alaniz                                                                               

Age: 67 yrs                                                                                       

Sex: Female                                                                                       

: 1950                                                                                   

MRN: [id number]                                                                                   

Arrival Date: 2018                                                                          

Time: 18:47                                                                                       

Account#: [id number]                                                                            

Bed 18                                                                                            

Private MD: out of town, doctor                                                                   

ED Physician Nish Hunt                                                                      

HPI:                                                                                              

                                                                                             

21:02 This 67 yrs old  Female presents to ER via Wheelchair with complaints of       jr8 

      Headache, Nausea/Vomiting.                                                                  

21:02 The patient complains of pain to the diffuse . The patient describes the headache as    jr8 

      throbbing. Onset: The symptoms/episode began/occurred acutely, today. Associated signs      

      and symptoms: Pertinent positives: nausea, Photophobia. Severity of symptoms: At its        

      worst the pain was moderate, in the emergency department the pain is unchanged.             

      Headache History: The patient has had previous headaches and this one is different than     

      previous episodes. The symptoms are alleviated by nothing. the symptoms are aggravated      

      by lights, movement, noise. The patient has not recently seen a physician.                  

                                                                                                  

Historical:                                                                                       

- Allergies:                                                                                      

19:09 POTASSIUM CHLORIDE; can only take IV form;                                              la1 

19:09 Triamterene;                                                                            la1 

- PMHx:                                                                                           

19:09 Anemia; Anxiety; Back pain; CHF; COPD; CVA; Diabetes - NIDDM; High Cholesterol;         la1 

      Hypertension; Migraines; TIA;                                                               

                                                                                                  

- Immunization history:: Adult Immunizations up to date.                                          

- Social history:: Smoking status: Patient/guardian denies using tobacco.                         

                                                                                                  

                                                                                                  

ROS:                                                                                              

21:02 Eyes: Negative for injury, pain, redness, and discharge, ENT: Negative for injury,      jr8 

      pain, and discharge, Neck: Negative for injury, pain, and swelling, Cardiovascular:         

      Negative for chest pain, palpitations, and edema, Respiratory: Negative for shortness       

      of breath, cough, wheezing, and pleuritic chest pain, Abdomen/GI: Negative for              

      abdominal pain, nausea, vomiting, diarrhea, and constipation, Back: Negative for injury     

      and pain, MS/Extremity: Negative for injury and deformity, Skin: Negative for injury,       

      rash, and discoloration.                                                                    

21:02 Neuro: Positive for headache, Negative for altered mental status, dizziness, gait           

      disturbance, hearing loss, loss of consciousness, numbness, seizure activity, speech        

      changes, syncope, near syncope, tingling, tinnitus, tremor, visual changes, weakness.       

                                                                                                  

Exam:                                                                                             

21:02 Head/Face:  Normocephalic, atraumatic. Eyes:  Pupils equal round and reactive to light, jr8 

      extra-ocular motions intact.  Lids and lashes normal.  Conjunctiva and sclera are           

      non-icteric and not injected.  Cornea within normal limits.  Periorbital areas with no      

      swelling, redness, or edema. ENT:  Nares patent. No nasal discharge, no septal              

      abnormalities noted.  Tympanic membranes are normal and external auditory canals are        

      clear.  Oropharynx with no redness, swelling, or masses, exudates, or evidence of           

      obstruction, uvula midline.  Mucous membranes moist. Neck:  Trachea midline, no             

      thyromegaly or masses palpated, and no cervical lymphadenopathy.  Supple, full range of     

      motion without nuchal rigidity, or vertebral point tenderness.  No Meningismus.             

      Cardiovascular:  Regular rate and rhythm with a normal S1 and S2.  No gallops, murmurs,     

      or rubs.  Normal PMI, no JVD.  No pulse deficits. Respiratory:  Lungs have equal breath     

      sounds bilaterally, clear to auscultation and percussion.  No rales, rhonchi or wheezes     

      noted.  No increased work of breathing, no retractions or nasal flaring. Abdomen/GI:        

      Soft, non-tender, with normal bowel sounds.  No distension or tympany.  No guarding or      

      rebound.  No evidence of tenderness throughout. Back:  No spinal tenderness.  No            

      costovertebral tenderness.  Full range of motion. Skin:  Warm, dry with normal turgor.      

      Normal color with no rashes, no lesions, and no evidence of cellulitis. MS/ Extremity:      

      Pulses equal, no cyanosis.  Neurovascular intact.  Full, normal range of motion. Neuro:     

       Awake and alert, GCS 15, oriented to person, place, time, and situation.  Cranial          

      nerves II-XII grossly intact.  Motor strength 5/5 in all extremities.  Sensory grossly      

      intact.  Cerebellar exam normal.  Normal gait.                                              

                                                                                                  

Vital Signs:                                                                                      

19:09  / 76; Pulse 62; Resp 19; Temp 98.2; Pulse Ox 100% on R/A; Weight 124.74 kg;      la1 

      Height 5 ft. 1 in. (154.94 cm);                                                             

20:18  / 77; Pulse 60; Resp 19 S; Pulse Ox 98% on R/A; Pain 10/10;                      jd3 

22:01  / 92; Pulse 64; Resp 18 S; Pulse Ox 97% on R/A;                                  jd3 

23:03  / 86; Pulse 68; Resp 18 S; Pulse Ox 98% on R/A; Pain 7/10;                       jd3 

19:09 Body Mass Index 51.96 (124.74 kg, 154.94 cm)                                            la1 

                                                                                                  

MDM:                                                                                              

20:34 Patient medically screened.                                                             jr8 

21:54 Data reviewed: vital signs, nurses notes, radiologic studies, CT scan. Data             jr8 

      interpreted: Pulse oximetry: on room air is 98 %. Interpretation: normal. Counseling: I     

      had a detailed discussion with the patient and/or guardian regarding: the historical        

      points, exam findings, and any diagnostic results supporting the discharge/admit            

      diagnosis, radiology results, the need for outpatient follow up, a neurologist, to          

      return to the emergency department if symptoms worsen or persist or if there are any        

      questions or concerns that arise at home. ED course: Patient has only minimally             

      improved. Will medicate again and do CT of head since she has aneurismal history .          

22:30 ED course: Patient feeling better now after fentanyl .                                  8 

                                                                                                  

                                                                                             

21:54 Order name: CT Head Brain wo Cont                                                       jr8 

                                                                                             

20:34 Order name: IV; Complete Time: 21:15                                                    jr8 

                                                                                                  

Administered Medications:                                                                         

21:14 Drug: TORadol 30 mg Route: IVP; Site: right antecubital;                                jd3 

22:09 Follow up: Response: No adverse reaction                                                jd3 

21:15 Drug: NS 0.9% 1000 ml Route: IV; Rate: 1000 ml; Site: right antecubital;                jd3 

22:08 Follow up: Response: No adverse reaction; IV Status: Completed infusion; IV Intake:     jd3 

      1000ml                                                                                      

21:15 Drug: Reglan 10 mg Route: IVP; Site: right antecubital;                                 jd3 

22:09 Follow up: Response: No adverse reaction                                                jd3 

21:15 Drug: Benadryl 25 mg Route: IVP; Site: right antecubital;                               jd3 

22:09 Follow up: Response: No adverse reaction                                                jd3 

21:15 Drug: Decadron - Dexamethasone 10 mg Route: IVP; Site: right antecubital;               jd3 

22:09 Follow up: Response: No adverse reaction                                                jd3 

22:08 Drug: fentaNYL (PF) 50 mcg Route: IVP; Site: right antecubital;                         jd3 

23:03 Follow up: Response: No adverse reaction                                                jd3 

                                                                                                  

                                                                                                  

Disposition:                                                                                      

                                                                                             

07:14 Co-signature as Attending Physician, Nish Hunt MD I agree with the assessment and  alecia 

      plan of care.                                                                               

                                                                                                  

Disposition:                                                                                      

18 22:31 Discharged to Home. Impression: Migraine.                                          

- Condition is Stable.                                                                            

- Discharge Instructions: Migraine Headache.                                                      

                                                                                                  

- Medication Reconciliation Form, Thank You Letter, Antibiotic Education, Prescription            

  Opioid Use form.                                                                                

- Follow up: Private Physician; When: 1 - 2 days; Reason: Recheck today's complaints,             

  Continuance of care, Re-evaluation by your physician.                                           

- Problem is new.                                                                                 

- Symptoms have improved.                                                                         

                                                                                                  

                                                                                                  

                                                                                                  

Signatures:                                                                                       

Dispatcher [employer]                           Nish Fernandez MD MD cha Roszak, Josh, PA PA   jr8                                                  

Thee Aquino RN RN   la1                                                  

Gabriel Hernandez RN                    RN   jd3                                                  

                                                                                                  

Corrections: (The following items were deleted from the chart)                                    

                                                                                             

23:03 22:31 2018 22:31 Discharged to Home. Impression: Migraine. Condition is Stable.   jd3 

      Forms are Medication Reconciliation Form, Thank You Letter, Antibiotic Education,           

      Prescription Opioid Use. Follow up: Private Physician; When: 1 - 2 days; Reason:            

      Recheck today's complaints, Continuance of care, Re-evaluation by your physician.           

      Problem is new. Symptoms have improved. jr8                                                 

                                                                                                  

**************************************************************************************************

## 2018-05-17 NOTE — RAD REPORT
EXAM DESCRIPTION:  CT - Head Brain Wo Cont - 5/17/2018 5:59 am

 

CLINICAL HISTORY:  Headache, vomiting

 

COMPARISON:  04/16/2018

 

TECHNIQUE:  All CT scans are performed using dose optimization technique as appropriate and may inclu
de automated exposure control or mA/KV adjustment according to patient size.

 

FINDINGS:  No intracranial hemorrhage, hydrocephalus or extra-axial fluid collection.Gliosis is seen 
left posterior parietal region related to old infarct.No areas of brain edema or evidence of midline 
shift. Aneurysm coil left carotid terminus noted.

 

The paranasal sinuses and mastoids are clear. The calvarium is intact.

 

IMPRESSION:  No acute intracranial abnormality.